# Patient Record
Sex: FEMALE | Race: WHITE | NOT HISPANIC OR LATINO | Employment: OTHER | URBAN - METROPOLITAN AREA
[De-identification: names, ages, dates, MRNs, and addresses within clinical notes are randomized per-mention and may not be internally consistent; named-entity substitution may affect disease eponyms.]

---

## 2017-01-17 ENCOUNTER — ALLSCRIPTS OFFICE VISIT (OUTPATIENT)
Dept: OTHER | Facility: OTHER | Age: 52
End: 2017-01-17

## 2017-02-21 ENCOUNTER — ALLSCRIPTS OFFICE VISIT (OUTPATIENT)
Dept: OTHER | Facility: OTHER | Age: 52
End: 2017-02-21

## 2017-02-24 DIAGNOSIS — E78.5 HYPERLIPIDEMIA: ICD-10-CM

## 2017-02-24 DIAGNOSIS — G40.909 EPILEPSY WITHOUT STATUS EPILEPTICUS, NOT INTRACTABLE (HCC): ICD-10-CM

## 2017-11-29 ENCOUNTER — ALLSCRIPTS OFFICE VISIT (OUTPATIENT)
Dept: OTHER | Facility: OTHER | Age: 52
End: 2017-11-29

## 2017-11-30 NOTE — PROGRESS NOTES
History of Present Illness  Ear Fullness: Current treatment includes non-prescription cerumen softeners  Active Problems  1  Ambulatory dysfunction (719 7) (R26 2)  2  Borderline hyperlipidemia (272 4) (E78 5)  3  Osteoarthritis of knee (715 36) (M17 10)  4  Seizure disorder (345 90) (G40 909)  5  Tinea pedis (110 4) (B35 3)    Past Medical History  1  History of Cystocele  2  History of cerebral palsy (V12 49) (Z86 69)    Family History  Mother   1  Family history of osteoarthritis (V17 89) (Z82 69)  2  Family history of thyroid disease (V18 19) (Z83 49)  Father   3  Family history of Alzheimer's disease (V17 2) (Z82 0)  Maternal Grandmother   4  Family history of Coronary heart disease  5  Family history of malignant neoplasm of cervix (V16 49) (Z80 49)  Maternal Grandfather   6  Family history of Coronary heart disease    Social History   · Currently works part-time (V49 89) (Z78 9)   · Lives with parents   · Never smoked    Current Meds  1  Aspirin EC 81 MG Oral Tablet Delayed Release; Therapy: (Recorded:07Pjw7013) to Recorded  2  CarBAMazepine  MG Oral Tablet Extended Release 12 Hour; Therapy: (Recorded:17Jan2017) to Recorded    Allergies  1  No Known Drug Allergies    Vitals   Recorded: 65MAD2920 03:03PM   Height 5 ft 1 in   Weight 175 lb    BMI Calculated 33 07   BSA Calculated 1 78     Procedure           Procedure: cerumen removal   Indication: in both ears  Prep: Cerumenex was placed in the canal prior to the procedure  Procedure Note: The procedure was performed by RN  A otoscope was placed in the ear canal(s) to visualize the ear canal debris  The ear was cleaned by using suction-- and-- warm water irrigation  The procedure was successful  Post-Procedure:  Patient Status: the patient tolerated the procedure well  Complications: there were no complications  Follow-up as needed              Signatures   Electronically signed by : Dara Mckenzie, ; Dec 13 2017  1:53PM EST (Author)    Electronically signed by : Ezra Lynch, [facility]; Dec 13 2017  1:57PM EST                       (Author)    Electronically signed by : DELICIA Black ; Dec 13 2017  1:58PM EST                       (Author)

## 2018-01-13 VITALS — BODY MASS INDEX: 33.04 KG/M2 | HEIGHT: 61 IN | WEIGHT: 175 LBS

## 2018-01-15 VITALS
SYSTOLIC BLOOD PRESSURE: 110 MMHG | BODY MASS INDEX: 32.39 KG/M2 | DIASTOLIC BLOOD PRESSURE: 64 MMHG | HEIGHT: 62 IN | WEIGHT: 176 LBS

## 2018-01-15 NOTE — PROGRESS NOTES
Assessment    1  Encounter for preventive health examination (V70 0) (Z00 00)    Plan  Health Maintenance    · Follow-up visit in 1 year Evaluation and Treatment  Follow-up  Status: Hold For -  Scheduling  Requested for: 77Lyc1561   · Drink plenty of fluids ; Status:Complete;   Done: 95IMX0386 10:37AM   · We encourage all of our patients to exercise regularly  30 minutes of exercise or physical  activity five or more days a week is recommended for children and adults ;  Status:Complete;   Done: 26Vkl6139 10:37AM   · We recommend that you follow the "Mediterranean diet "; Status:Complete;   Done:  16Dat1775 10:37AM    Check Fasting lipid profile, CMP, CBC, TSH  Form/documents completed and signed  PPD today, check 48-72 hrs     Discussion/Summary  Extended discussion about the family stressors donna regarding her father's AD progression, support for her and her mother, work network  Chief Complaint  pe      History of Present Illness  HPI: 47 y/o WF for yearly PE for work  The pt works PT at [employer] the [employer]  No acute c/o  She does report increased stress at home RE: her father's progressive deterioration in his AD  Review of Systems    Constitutional: No fever, no chills, feels well, no tiredness, no recent weight gain or weight loss  Eyes: wears glasses  Lateral deviation of right eye  ENT: no complaints of earache, no loss of hearing, no nose bleeds, no nasal discharge, no sore throat, no hoarseness  Cardiovascular: No complaints of slow heart rate, no fast heart rate, no chest pain, no palpitations, no leg claudication, no lower extremity edema  Respiratory: No complaints of shortness of breath, no wheezing, no cough, no SOB on exertion, no orthopnea, no PND  Gastrointestinal: No complaints of abdominal pain, no constipation, no nausea or vomiting, no diarrhea, no bloody stools     Genitourinary: No complaints of dysuria, no incontinence, no pelvic pain, no dysmenorrhea, no vaginal discharge or bleeding  Musculoskeletal: arthralgias, joint stiffness and DJD bilateral knees  Sees Ortho q3 mo  Integumentary: No complaints of skin rash or lesions, no itching, no skin wounds, no breast pain or lump  Neurological: limb weakness, difficulty walking and hemiparetic gait; LUE and LLE weakness, but as noted in HPI  Endocrine: No complaints of proptosis, no hot flashes, no muscle weakness, no deepening of the voice, no feelings of weakness  Hematologic/Lymphatic: No complaints of swollen glands, no swollen glands in the neck, does not bleed easily, does not bruise easily  Active Problems    1  Ambulatory dysfunction (719 7) (R26 2)   2  Osteoarthritis of knee (715 36) (M17 9)   3  Seizure disorder (345 90) (G40 909)   4  Tinea pedis (110 4) (B35 3)    Past Medical History    · History of Cystocele (618 01) (N81 10)   · History of cerebral palsy (V12 49) (Z86 69)    Family History  Mother    · Family history of osteoarthritis (V17 89) (Z82 69)   · Family history of thyroid disease (V18 19) (Z80 46)  Father    · Family history of Alzheimer's disease (V17 2) (Z82 0)  Maternal Grandmother    · Family history of Coronary heart disease   · Family history of malignant neoplasm of cervix (V16 49) (Z80 49)  Maternal Grandfather    · Family history of Coronary heart disease    Social History    · Currently works part-time (V49 89) (Z78 9)   · Lives with parents   · Never smoked    Current Meds   1  Aspirin EC 81 MG Oral Tablet Delayed Release; Therapy: (Recorded:17Jan2017) to Recorded   2  CarBAMazepine  MG Oral Tablet Extended Release 12 Hour; Therapy: (Recorded:17Jan2017) to Recorded    Allergies    1   No Known Drug Allergies    Vitals   Recorded: 33Nsd1150 09:37AM   Heart Rate 72   Systolic 977, RUE, Sitting   Diastolic 72, RUE, Sitting   Height 5 ft 1 75 in   Weight 176 lb    BMI Calculated 32 45   BSA Calculated 1 81     Physical Exam    Constitutional   General appearance: No acute distress, well appearing and well nourished  Eyes   Conjunctiva and lids: No swelling, erythema or discharge  Right eye deviation  Pupils and irises: Equal, round and reactive to light  Ears, Nose, Mouth, and Throat   External inspection of ears and nose: Normal     Oropharynx: Normal with no erythema, edema, exudate or lesions  Pulmonary   Respiratory effort: No increased work of breathing or signs of respiratory distress  Auscultation of lungs: Clear to auscultation  Cardiovascular   Auscultation of heart: Normal rate and rhythm, normal S1 and S2, without murmurs  Examination of extremities for edema and/or varicosities: Normal     Abdomen   Abdomen: Non-tender, no masses  Liver and spleen: No hepatomegaly or splenomegaly  Lymphatic   Palpation of lymph nodes in neck: No lymphadenopathy  Musculoskeletal   Gait and station: Abnormal   Gait evaluation demonstrated shuffling, hemiparetic on the right and limping on the right, but no antalgia  Digits and nails: Normal without clubbing or cyanosis  Inspection/palpation of joints, bones, and muscles: Normal     Skin   Skin and subcutaneous tissue: Normal without rashes or lesions  Neurologic   Cranial nerves: Abnormal   Cranial Nerve II: visual acuity and visual fields were intact  Cranial Nerves III, IV, and VI: Abnormal   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  Cranial Nerve V: sensation to the face and masseter strength were intact  Cranial Nerve VII: facial strength was intact bilaterally  Cranial Nerve VIII: hearing was intact  Cranial Nerves IX and X: there was normal movement of the soft palate and normal gag  Cranial Nerve XI: shoulder shrug was intact bilaterally  Cranial Nerve XII: there was no tongue deviation with protrusion  Reflexes: 2+ and symmetric  Sensation: No sensory loss      Psychiatric   Orientation to person, place, and time: Normal     Mood and affect: Normal        Signatures   Electronically signed by : DELICIA Higuera ; Feb 21 2017 10:40AM EST                       (Author)

## 2018-01-22 VITALS
WEIGHT: 176 LBS | HEART RATE: 72 BPM | SYSTOLIC BLOOD PRESSURE: 118 MMHG | DIASTOLIC BLOOD PRESSURE: 72 MMHG | BODY MASS INDEX: 32.39 KG/M2 | HEIGHT: 62 IN

## 2018-02-01 DIAGNOSIS — G80.9 CEREBRAL PALSY, UNSPECIFIED TYPE (HCC): Primary | ICD-10-CM

## 2018-02-01 RX ORDER — CARBAMAZEPINE 200 MG/1
1 TABLET, EXTENDED RELEASE ORAL 2 TIMES DAILY
COMMUNITY
End: 2018-02-01 | Stop reason: SDUPTHER

## 2018-02-01 RX ORDER — CARBAMAZEPINE 200 MG/1
200 TABLET, EXTENDED RELEASE ORAL 2 TIMES DAILY
Qty: 30 TABLET | Refills: 5 | Status: SHIPPED | OUTPATIENT
Start: 2018-02-01 | End: 2018-08-16 | Stop reason: SDUPTHER

## 2018-02-20 ENCOUNTER — TELEPHONE (OUTPATIENT)
Dept: FAMILY MEDICINE CLINIC | Facility: CLINIC | Age: 53
End: 2018-02-20

## 2018-03-09 DIAGNOSIS — M15.9 GENERALIZED OSTEOARTHROSIS, INVOLVING MULTIPLE SITES: Primary | ICD-10-CM

## 2018-03-09 RX ORDER — IBUPROFEN 600 MG/1
600 TABLET ORAL EVERY 8 HOURS PRN
Qty: 90 TABLET | Refills: 1 | Status: SHIPPED | OUTPATIENT
Start: 2018-03-09 | End: 2018-05-31 | Stop reason: SDUPTHER

## 2018-04-09 ENCOUNTER — OFFICE VISIT (OUTPATIENT)
Dept: FAMILY MEDICINE CLINIC | Facility: CLINIC | Age: 53
End: 2018-04-09
Payer: MEDICARE

## 2018-04-09 VITALS
HEART RATE: 74 BPM | WEIGHT: 161 LBS | OXYGEN SATURATION: 98 % | BODY MASS INDEX: 31.61 KG/M2 | DIASTOLIC BLOOD PRESSURE: 52 MMHG | SYSTOLIC BLOOD PRESSURE: 90 MMHG | TEMPERATURE: 99.3 F | HEIGHT: 60 IN

## 2018-04-09 DIAGNOSIS — E78.2 MIXED HYPERLIPIDEMIA: ICD-10-CM

## 2018-04-09 DIAGNOSIS — G40.909 SEIZURE DISORDER (HCC): ICD-10-CM

## 2018-04-09 DIAGNOSIS — F79 MENTAL RETARDATION: ICD-10-CM

## 2018-04-09 DIAGNOSIS — Z00.00 ENCOUNTER FOR ROUTINE ADULT HEALTH EXAMINATION WITHOUT ABNORMAL FINDINGS: Primary | ICD-10-CM

## 2018-04-09 PROBLEM — M17.10 OSTEOARTHRITIS OF KNEE: Status: ACTIVE | Noted: 2017-01-17

## 2018-04-09 PROBLEM — R26.2 AMBULATORY DYSFUNCTION: Status: ACTIVE | Noted: 2017-01-17

## 2018-04-09 PROBLEM — B35.3 TINEA PEDIS: Status: ACTIVE | Noted: 2017-01-17

## 2018-04-09 PROBLEM — E78.5 BORDERLINE HYPERLIPIDEMIA: Status: ACTIVE | Noted: 2017-02-24

## 2018-04-09 PROCEDURE — 99396 PREV VISIT EST AGE 40-64: CPT | Performed by: PHYSICIAN ASSISTANT

## 2018-04-09 RX ORDER — SIMVASTATIN 40 MG
40 TABLET ORAL
Qty: 30 TABLET | Refills: 3 | Status: SHIPPED | OUTPATIENT
Start: 2018-04-09 | End: 2018-08-23 | Stop reason: SDUPTHER

## 2018-04-09 NOTE — PROGRESS NOTES
Assessment/Plan:    Encounter for routine adult health examination without abnormal findings  Pt does not partake in most screening such as pap, mammo, colonoscopy etc  Sees dentist regularly  CMP, TSH, lipids at next visit    Mixed hyperlipidemia  Pt and mother agreeable to starting statin therapy for HLD  Will start simvastatin 40 mg  Lipid panel and CMP in 3 months    Seizure disorder ([facility] [address] )  Well controlled on carbamazepine    Mental retardation  Working at [employer] and lives with mother who is her caretaker       Diagnoses and all orders for this visit:    Encounter for routine adult health examination without abnormal findings  -     simvastatin (ZOCOR) 40 mg tablet; Take 1 tablet (40 mg total) by mouth daily at bedtime    Mixed hyperlipidemia    Mental retardation    Seizure disorder ([address] )    Other orders  -     aspirin 81 MG tablet; Take 1 tablet by mouth daily          Subjective:      Patient ID: Allison Carmona is a 45 y o  female  44 y/o female hx hyperlipidemia, MR, And unspecified seizure disorder presents for annual exam    Lipid panel performed last year revealed total cholesterol 279, , HDL 70 and triglycerides 173  Mother reports that they are now made aware of this  Patient has a poor diet and does not exercise regularly   seizure disorder has been well controlled with carbamazepine 300 mg twice per day  Carbamazepine levels drawn last year were within normal limits  She is unable to describe her specific seizure disorder  Lives at home with her mother who is her caretaker    Working at the [employer]   The patient does not partake in Pap, mammograms or colonoscopy due to mental retardation  At the discretion of her mother        The following portions of the patient's history were reviewed and updated as appropriate: allergies, current medications, past family history, past medical history, past social history, past surgical history and problem list     Review of Systems   Constitutional: Negative for diaphoresis, fatigue and fever  HENT: Negative for congestion, ear pain, hearing loss, postnasal drip, rhinorrhea and sore throat  Eyes: Negative for pain, redness and visual disturbance  Respiratory: Negative for cough, shortness of breath and wheezing  Cardiovascular: Negative for chest pain, palpitations and leg swelling  Gastrointestinal: Negative for anal bleeding, constipation, diarrhea, nausea and vomiting  Endocrine: Negative for polydipsia and polyuria  Genitourinary: Negative for dysuria, flank pain and hematuria  Musculoskeletal: Positive for arthralgias  Negative for back pain, joint swelling and myalgias  Skin: Negative for pallor, rash and wound  Neurological: Negative for dizziness, syncope, numbness and headaches  Hematological: Negative for adenopathy  Does not bruise/bleed easily  Psychiatric/Behavioral: Negative for dysphoric mood and sleep disturbance  The patient is not nervous/anxious  Objective:      BP 90/52 (BP Location: Left arm, Patient Position: Sitting, Cuff Size: Large)   Pulse 74   Temp 99 3 °F (37 4 °C)   Ht 5' (1 524 m)   Wt 73 kg (161 lb)   SpO2 98%   BMI 31 44 kg/m²          Physical Exam   Constitutional: She is oriented to person, place, and time  She appears well-developed and well-nourished  No distress  HENT:   Head: Normocephalic and atraumatic  Mouth/Throat: Oropharynx is clear and moist    Eyes: Conjunctivae and EOM are normal  Pupils are equal, round, and reactive to light  Right eye exhibits no discharge  Left eye exhibits no discharge  No scleral icterus  Strabismus     Neck: Normal range of motion  Neck supple  No thyromegaly present  Cardiovascular: Normal rate, regular rhythm and normal heart sounds  Exam reveals no gallop and no friction rub  No murmur heard  Pulmonary/Chest: Effort normal and breath sounds normal  No respiratory distress  She has no wheezes  She has no rales  Abdominal: Soft   She exhibits no distension and no mass  There is no tenderness  There is no rebound and no guarding  Musculoskeletal: Normal range of motion  She exhibits no edema  Lymphadenopathy:     She has no cervical adenopathy  Neurological: She is alert and oriented to person, place, and time  No cranial nerve deficit  Skin: Skin is warm and dry  No rash noted  She is not diaphoretic  No erythema  No pallor  Psychiatric: Cognition and memory are impaired

## 2018-04-09 NOTE — ASSESSMENT & PLAN NOTE
Pt and mother agreeable to starting statin therapy for HLD  Will start simvastatin 40 mg   Lipid panel and CMP in 3 months

## 2018-04-09 NOTE — ASSESSMENT & PLAN NOTE
Pt does not partake in most screening such as pap, mammo, colonoscopy etc  Sees dentist regularly   CMP, TSH, lipids at next visit

## 2018-05-31 DIAGNOSIS — M15.9 GENERALIZED OSTEOARTHROSIS, INVOLVING MULTIPLE SITES: ICD-10-CM

## 2018-06-01 RX ORDER — IBUPROFEN 600 MG/1
600 TABLET ORAL EVERY 8 HOURS PRN
Qty: 90 TABLET | Refills: 1 | Status: SHIPPED | OUTPATIENT
Start: 2018-06-01 | End: 2018-08-03 | Stop reason: SDUPTHER

## 2018-08-03 DIAGNOSIS — M15.9 GENERALIZED OSTEOARTHROSIS, INVOLVING MULTIPLE SITES: ICD-10-CM

## 2018-08-03 RX ORDER — IBUPROFEN 600 MG/1
600 TABLET ORAL EVERY 8 HOURS PRN
Qty: 90 TABLET | Refills: 1 | Status: SHIPPED | OUTPATIENT
Start: 2018-08-03 | End: 2018-10-04 | Stop reason: SDUPTHER

## 2018-08-16 DIAGNOSIS — G80.9 CEREBRAL PALSY, UNSPECIFIED TYPE (HCC): ICD-10-CM

## 2018-08-16 RX ORDER — CARBAMAZEPINE 200 MG/1
200 TABLET, EXTENDED RELEASE ORAL 2 TIMES DAILY
Qty: 180 TABLET | Refills: 1 | Status: SHIPPED | OUTPATIENT
Start: 2018-08-16 | End: 2019-02-19 | Stop reason: SDUPTHER

## 2018-08-23 DIAGNOSIS — Z00.00 ENCOUNTER FOR ROUTINE ADULT HEALTH EXAMINATION WITHOUT ABNORMAL FINDINGS: ICD-10-CM

## 2018-08-23 RX ORDER — SIMVASTATIN 40 MG
40 TABLET ORAL
Qty: 30 TABLET | Refills: 11 | Status: SHIPPED | OUTPATIENT
Start: 2018-08-23 | End: 2019-09-26 | Stop reason: SDUPTHER

## 2018-10-04 DIAGNOSIS — M15.9 GENERALIZED OSTEOARTHROSIS, INVOLVING MULTIPLE SITES: ICD-10-CM

## 2018-10-04 RX ORDER — IBUPROFEN 600 MG/1
600 TABLET ORAL EVERY 8 HOURS PRN
Qty: 90 TABLET | Refills: 1 | Status: SHIPPED | OUTPATIENT
Start: 2018-10-04 | End: 2018-12-24 | Stop reason: SDUPTHER

## 2018-12-24 DIAGNOSIS — M15.9 GENERALIZED OSTEOARTHROSIS, INVOLVING MULTIPLE SITES: ICD-10-CM

## 2018-12-24 DIAGNOSIS — E78.2 MIXED HYPERLIPIDEMIA: Primary | ICD-10-CM

## 2018-12-24 RX ORDER — IBUPROFEN 600 MG/1
600 TABLET ORAL EVERY 8 HOURS PRN
Qty: 90 TABLET | Refills: 1 | Status: SHIPPED | OUTPATIENT
Start: 2018-12-24 | End: 2019-09-26 | Stop reason: SDUPTHER

## 2018-12-24 NOTE — TELEPHONE ENCOUNTER
Pt overdue for appt- please have her schedule f/u appt and come in for fasting lipids and cmp several days prior as we had started her on a statin at the last visit

## 2019-01-08 ENCOUNTER — TELEPHONE (OUTPATIENT)
Dept: FAMILY MEDICINE CLINIC | Facility: CLINIC | Age: 54
End: 2019-01-08

## 2019-01-08 ENCOUNTER — DOCUMENTATION (OUTPATIENT)
Dept: FAMILY MEDICINE CLINIC | Facility: CLINIC | Age: 54
End: 2019-01-08

## 2019-02-16 DIAGNOSIS — G80.9 CEREBRAL PALSY, UNSPECIFIED TYPE (HCC): ICD-10-CM

## 2019-02-17 RX ORDER — CARBAMAZEPINE 200 MG/1
TABLET, EXTENDED RELEASE ORAL
Qty: 180 TABLET | Refills: 1 | OUTPATIENT
Start: 2019-02-17

## 2019-02-19 ENCOUNTER — OFFICE VISIT (OUTPATIENT)
Dept: FAMILY MEDICINE CLINIC | Facility: CLINIC | Age: 54
End: 2019-02-19
Payer: MEDICARE

## 2019-02-19 VITALS
TEMPERATURE: 97.9 F | BODY MASS INDEX: 28.34 KG/M2 | WEIGHT: 154 LBS | HEART RATE: 61 BPM | DIASTOLIC BLOOD PRESSURE: 70 MMHG | SYSTOLIC BLOOD PRESSURE: 120 MMHG | HEIGHT: 62 IN | RESPIRATION RATE: 18 BRPM | OXYGEN SATURATION: 98 %

## 2019-02-19 DIAGNOSIS — E78.2 MIXED HYPERLIPIDEMIA: ICD-10-CM

## 2019-02-19 DIAGNOSIS — Z11.59 NEED FOR HEPATITIS C SCREENING TEST: ICD-10-CM

## 2019-02-19 DIAGNOSIS — Z00.00 MEDICARE ANNUAL WELLNESS VISIT, SUBSEQUENT: Primary | ICD-10-CM

## 2019-02-19 DIAGNOSIS — G40.909 SEIZURE DISORDER (HCC): ICD-10-CM

## 2019-02-19 DIAGNOSIS — G80.9 CEREBRAL PALSY, UNSPECIFIED TYPE (HCC): ICD-10-CM

## 2019-02-19 DIAGNOSIS — Z79.899 ON CARBAMAZEPINE THERAPY: ICD-10-CM

## 2019-02-19 LAB
ALBUMIN SERPL BCP-MCNC: 3.9 G/DL (ref 3.5–5)
ALP SERPL-CCNC: 118 U/L (ref 46–116)
ALT SERPL W P-5'-P-CCNC: 27 U/L (ref 12–78)
ANION GAP SERPL CALCULATED.3IONS-SCNC: 7 MMOL/L (ref 4–13)
AST SERPL W P-5'-P-CCNC: 16 U/L (ref 5–45)
BASOPHILS # BLD AUTO: 0.05 THOUSANDS/ΜL (ref 0–0.1)
BASOPHILS NFR BLD AUTO: 1 % (ref 0–1)
BILIRUB SERPL-MCNC: 0.32 MG/DL (ref 0.2–1)
BUN SERPL-MCNC: 14 MG/DL (ref 5–25)
CALCIUM SERPL-MCNC: 9 MG/DL (ref 8.3–10.1)
CARBAMAZEPINE SERPL-MCNC: 9.4 UG/ML (ref 4–12)
CHLORIDE SERPL-SCNC: 108 MMOL/L (ref 100–108)
CHOLEST SERPL-MCNC: 196 MG/DL (ref 50–200)
CO2 SERPL-SCNC: 29 MMOL/L (ref 21–32)
CREAT SERPL-MCNC: 0.59 MG/DL (ref 0.6–1.3)
EOSINOPHIL # BLD AUTO: 0.26 THOUSAND/ΜL (ref 0–0.61)
EOSINOPHIL NFR BLD AUTO: 4 % (ref 0–6)
ERYTHROCYTE [DISTWIDTH] IN BLOOD BY AUTOMATED COUNT: 12 % (ref 11.6–15.1)
GFR SERPL CREATININE-BSD FRML MDRD: 104 ML/MIN/1.73SQ M
GLUCOSE P FAST SERPL-MCNC: 88 MG/DL (ref 65–99)
HCT VFR BLD AUTO: 42.3 % (ref 34.8–46.1)
HDLC SERPL-MCNC: 66 MG/DL (ref 40–60)
HGB BLD-MCNC: 13.7 G/DL (ref 11.5–15.4)
IMM GRANULOCYTES # BLD AUTO: 0.02 THOUSAND/UL (ref 0–0.2)
IMM GRANULOCYTES NFR BLD AUTO: 0 % (ref 0–2)
LDLC SERPL CALC-MCNC: 97 MG/DL (ref 0–100)
LYMPHOCYTES # BLD AUTO: 1.73 THOUSANDS/ΜL (ref 0.6–4.47)
LYMPHOCYTES NFR BLD AUTO: 25 % (ref 14–44)
MCH RBC QN AUTO: 31.6 PG (ref 26.8–34.3)
MCHC RBC AUTO-ENTMCNC: 32.4 G/DL (ref 31.4–37.4)
MCV RBC AUTO: 98 FL (ref 82–98)
MONOCYTES # BLD AUTO: 0.48 THOUSAND/ΜL (ref 0.17–1.22)
MONOCYTES NFR BLD AUTO: 7 % (ref 4–12)
NEUTROPHILS # BLD AUTO: 4.5 THOUSANDS/ΜL (ref 1.85–7.62)
NEUTS SEG NFR BLD AUTO: 63 % (ref 43–75)
NRBC BLD AUTO-RTO: 0 /100 WBCS
PLATELET # BLD AUTO: 257 THOUSANDS/UL (ref 149–390)
PMV BLD AUTO: 9.9 FL (ref 8.9–12.7)
POTASSIUM SERPL-SCNC: 4.8 MMOL/L (ref 3.5–5.3)
PROT SERPL-MCNC: 7.4 G/DL (ref 6.4–8.2)
RBC # BLD AUTO: 4.34 MILLION/UL (ref 3.81–5.12)
SODIUM SERPL-SCNC: 144 MMOL/L (ref 136–145)
TRIGL SERPL-MCNC: 165 MG/DL
TSH SERPL DL<=0.05 MIU/L-ACNC: 2.38 UIU/ML (ref 0.36–3.74)
WBC # BLD AUTO: 7.04 THOUSAND/UL (ref 4.31–10.16)

## 2019-02-19 PROCEDURE — 36415 COLL VENOUS BLD VENIPUNCTURE: CPT | Performed by: PHYSICIAN ASSISTANT

## 2019-02-19 PROCEDURE — 80061 LIPID PANEL: CPT | Performed by: PHYSICIAN ASSISTANT

## 2019-02-19 PROCEDURE — 85025 COMPLETE CBC W/AUTO DIFF WBC: CPT | Performed by: PHYSICIAN ASSISTANT

## 2019-02-19 PROCEDURE — G0439 PPPS, SUBSEQ VISIT: HCPCS | Performed by: PHYSICIAN ASSISTANT

## 2019-02-19 PROCEDURE — 80156 ASSAY CARBAMAZEPINE TOTAL: CPT | Performed by: PHYSICIAN ASSISTANT

## 2019-02-19 PROCEDURE — 80053 COMPREHEN METABOLIC PANEL: CPT | Performed by: PHYSICIAN ASSISTANT

## 2019-02-19 PROCEDURE — 99213 OFFICE O/P EST LOW 20 MIN: CPT | Performed by: PHYSICIAN ASSISTANT

## 2019-02-19 PROCEDURE — 84443 ASSAY THYROID STIM HORMONE: CPT | Performed by: PHYSICIAN ASSISTANT

## 2019-02-19 PROCEDURE — 86803 HEPATITIS C AB TEST: CPT | Performed by: PHYSICIAN ASSISTANT

## 2019-02-19 RX ORDER — CARBAMAZEPINE 200 MG/1
200 TABLET, EXTENDED RELEASE ORAL 2 TIMES DAILY
Qty: 180 TABLET | Refills: 1 | Status: SHIPPED | OUTPATIENT
Start: 2019-02-19 | End: 2019-08-19 | Stop reason: SDUPTHER

## 2019-02-19 NOTE — PROGRESS NOTES
Assessment/Plan:    Medicare annual wellness visit, subsequent  Fasting labs including hep c screening drawn today  The patient and her mother both refuse screening procedures such as Pap smear, colonoscopy, mammogram due to patient's MR  Five wishes Packet given today advised to complete for advance directive planning  Seizure disorder ([facility] [address] )  Well controlled on carbamazepine  Will check carbamazepine levels today    Mixed hyperlipidemia  Patient tolerating simvastatin 40 mg well  Will check lipid panel and CMP today to monitor response       Diagnoses and all orders for this visit:    Medicare annual wellness visit, subsequent    Mixed hyperlipidemia  -     CBC and differential  -     Comprehensive metabolic panel  -     Lipid Panel with Direct LDL reflex  -     TSH, 3rd generation with Free T4 reflex    Seizure disorder (HCC)  -     TSH, 3rd generation with Free T4 reflex    Cerebral palsy, unspecified type (HCC)  -     carBAMazepine (TEGretol XR) 200 mg 12 hr tablet; Take 1 tablet (200 mg total) by mouth 2 (two) times a day    On carbamazepine therapy  -     Carbamazepine level, total    Mixed hyperlipidemia   -     CBC and differential  -     Comprehensive metabolic panel  -     Lipid Panel with Direct LDL reflex  -     TSH, 3rd generation with Free T4 reflex    Need for hepatitis C screening test  -     Hepatitis C antibody          Subjective:      Patient ID: Homero Yadav is a 52 y o  female  61-year-old female presents for follow-up of hyperlipidemia as well as seizure disorder  She has a history of mental retardation as well as cerebral palsy  At last visit simvastatin 40 mg was started due to her very high cholesterol, LDL was over 170  She reports she has tolerated this addition well without muscle aches, abdominal pain or any other symptoms  Seizure disorder has been well controlled on carbamazepine    She is due for a level check  She continues to work 3 times per week      The following portions of the patient's history were reviewed and updated as appropriate: allergies, current medications, past family history, past medical history, past social history, past surgical history and problem list     Review of Systems   Constitutional: Negative for diaphoresis, fatigue and fever  HENT: Negative for congestion, ear pain, hearing loss, postnasal drip, rhinorrhea and sore throat  Eyes: Negative for pain, redness and visual disturbance  Respiratory: Negative for cough, shortness of breath and wheezing  Cardiovascular: Negative for chest pain, palpitations and leg swelling  Gastrointestinal: Negative for anal bleeding, constipation, diarrhea, nausea and vomiting  Endocrine: Negative for polydipsia and polyuria  Genitourinary: Negative for dysuria, flank pain and hematuria  Musculoskeletal: Negative for arthralgias, back pain, joint swelling and myalgias  Skin: Negative for pallor, rash and wound  Neurological: Negative for dizziness, syncope, numbness and headaches  Hematological: Negative for adenopathy  Does not bruise/bleed easily  Psychiatric/Behavioral: Negative for dysphoric mood and sleep disturbance  The patient is not nervous/anxious  Objective:      /70 (BP Location: Left arm, Patient Position: Sitting, Cuff Size: Large)   Pulse 61   Temp 97 9 °F (36 6 °C) (Tympanic)   Resp 18   Ht 5' 2" (1 575 m)   Wt 69 9 kg (154 lb)   SpO2 98%   BMI 28 17 kg/m²          Physical Exam   Constitutional: She is oriented to person, place, and time  She appears well-developed and well-nourished  No distress  HENT:   Head: Normocephalic and atraumatic  Mouth/Throat: Oropharynx is clear and moist    Eyes: Pupils are equal, round, and reactive to light  Conjunctivae and EOM are normal  Right eye exhibits no discharge  Left eye exhibits no discharge  No scleral icterus  Neck: Normal range of motion  Neck supple  No thyromegaly present     Cardiovascular: Normal rate, regular rhythm and normal heart sounds  Exam reveals no gallop and no friction rub  No murmur heard  Pulmonary/Chest: Effort normal and breath sounds normal  No respiratory distress  She has no wheezes  She has no rales  Abdominal: Soft  She exhibits no distension and no mass  There is no tenderness  There is no rebound and no guarding  Musculoskeletal: Normal range of motion  She exhibits no edema  Lymphadenopathy:     She has no cervical adenopathy  Neurological: She is alert and oriented to person, place, and time  No cranial nerve deficit  Skin: Skin is warm and dry  No rash noted  She is not diaphoretic  No erythema  No pallor

## 2019-02-19 NOTE — ASSESSMENT & PLAN NOTE
Patient tolerating simvastatin 40 mg well    Will check lipid panel and CMP today to monitor response

## 2019-02-19 NOTE — ASSESSMENT & PLAN NOTE
Fasting labs including hep c screening drawn today  The patient and her mother both refuse screening procedures such as Pap smear, colonoscopy, mammogram due to patient's MR  Five wishes Packet given today advised to complete for advance directive planning

## 2019-02-20 ENCOUNTER — TELEPHONE (OUTPATIENT)
Dept: FAMILY MEDICINE CLINIC | Facility: CLINIC | Age: 54
End: 2019-02-20

## 2019-02-20 LAB — HCV AB SER QL: NORMAL

## 2019-02-20 NOTE — TELEPHONE ENCOUNTER
----- Message from Willean Dancer, PA-C sent at 2/19/2019  8:00 PM EST -----  Please tell patient that cholesterol panel has significantly improved and is now at goal  Continue simvastatin 40 mg daily

## 2019-04-18 DIAGNOSIS — G80.9 CEREBRAL PALSY, UNSPECIFIED TYPE (HCC): ICD-10-CM

## 2019-04-19 RX ORDER — CARBAMAZEPINE 200 MG/1
200 TABLET, EXTENDED RELEASE ORAL 2 TIMES DAILY
Qty: 180 TABLET | Refills: 1 | OUTPATIENT
Start: 2019-04-19

## 2019-05-08 DIAGNOSIS — M15.9 GENERALIZED OSTEOARTHROSIS, INVOLVING MULTIPLE SITES: ICD-10-CM

## 2019-05-11 RX ORDER — IBUPROFEN 600 MG/1
600 TABLET ORAL EVERY 8 HOURS PRN
Qty: 90 TABLET | Refills: 1 | OUTPATIENT
Start: 2019-05-11

## 2019-05-18 DIAGNOSIS — M15.9 GENERALIZED OSTEOARTHROSIS, INVOLVING MULTIPLE SITES: ICD-10-CM

## 2019-05-18 RX ORDER — IBUPROFEN 600 MG/1
600 TABLET ORAL EVERY 8 HOURS PRN
Qty: 90 TABLET | Refills: 1 | OUTPATIENT
Start: 2019-05-18

## 2019-08-15 ENCOUNTER — TELEPHONE (OUTPATIENT)
Dept: FAMILY MEDICINE CLINIC | Facility: CLINIC | Age: 54
End: 2019-08-15

## 2019-08-15 NOTE — TELEPHONE ENCOUNTER
Juan Alberto Cruz, Patients family member called inquiring about the patients appointment on Monday stating they may need to change it  Unfortunately she is not on the communication consent and someone who is would need to call back for this information

## 2019-08-19 DIAGNOSIS — G80.9 CEREBRAL PALSY, UNSPECIFIED TYPE (HCC): ICD-10-CM

## 2019-08-19 DIAGNOSIS — G40.909 SEIZURE DISORDER (HCC): Primary | ICD-10-CM

## 2019-08-19 RX ORDER — CARBAMAZEPINE 200 MG/1
200 TABLET, EXTENDED RELEASE ORAL 2 TIMES DAILY
Qty: 180 TABLET | Refills: 0 | Status: SHIPPED | OUTPATIENT
Start: 2019-08-19 | End: 2019-11-18 | Stop reason: SDUPTHER

## 2019-08-22 ENCOUNTER — TELEPHONE (OUTPATIENT)
Dept: FAMILY MEDICINE CLINIC | Facility: CLINIC | Age: 54
End: 2019-08-22

## 2019-08-22 NOTE — TELEPHONE ENCOUNTER
Patients niece called stating she needs physical form completed  Patient had a physical with Rosangela Vanegas in February  She has not seen Dr Aliya Fox  Patient needs [facility] with Dr Aliya Fox to complete physical form  Form was completed per patients niece in February by Paola Trevizo and assisted living states they never received it  Patients niece will try to set up transportation and call back to make appt

## 2019-09-06 ENCOUNTER — APPOINTMENT (OUTPATIENT)
Dept: LAB | Facility: CLINIC | Age: 54
End: 2019-09-06
Payer: MEDICARE

## 2019-09-06 DIAGNOSIS — G40.909 SEIZURE DISORDER (HCC): ICD-10-CM

## 2019-09-06 LAB
ALBUMIN SERPL BCP-MCNC: 3.4 G/DL (ref 3.5–5)
ALP SERPL-CCNC: 123 U/L (ref 46–116)
ALT SERPL W P-5'-P-CCNC: 24 U/L (ref 12–78)
ANION GAP SERPL CALCULATED.3IONS-SCNC: 4 MMOL/L (ref 4–13)
AST SERPL W P-5'-P-CCNC: 15 U/L (ref 5–45)
BASOPHILS # BLD AUTO: 0.05 THOUSANDS/ΜL (ref 0–0.1)
BASOPHILS NFR BLD AUTO: 1 % (ref 0–1)
BILIRUB SERPL-MCNC: 0.24 MG/DL (ref 0.2–1)
BUN SERPL-MCNC: 13 MG/DL (ref 5–25)
CALCIUM SERPL-MCNC: 8.7 MG/DL (ref 8.3–10.1)
CARBAMAZEPINE SERPL-MCNC: 8.2 UG/ML (ref 4–12)
CHLORIDE SERPL-SCNC: 110 MMOL/L (ref 100–108)
CO2 SERPL-SCNC: 29 MMOL/L (ref 21–32)
CREAT SERPL-MCNC: 0.68 MG/DL (ref 0.6–1.3)
EOSINOPHIL # BLD AUTO: 0.3 THOUSAND/ΜL (ref 0–0.61)
EOSINOPHIL NFR BLD AUTO: 4 % (ref 0–6)
ERYTHROCYTE [DISTWIDTH] IN BLOOD BY AUTOMATED COUNT: 12 % (ref 11.6–15.1)
GFR SERPL CREATININE-BSD FRML MDRD: 99 ML/MIN/1.73SQ M
GLUCOSE P FAST SERPL-MCNC: 95 MG/DL (ref 65–99)
HCT VFR BLD AUTO: 39.4 % (ref 34.8–46.1)
HGB BLD-MCNC: 12.7 G/DL (ref 11.5–15.4)
IMM GRANULOCYTES # BLD AUTO: 0.02 THOUSAND/UL (ref 0–0.2)
IMM GRANULOCYTES NFR BLD AUTO: 0 % (ref 0–2)
LYMPHOCYTES # BLD AUTO: 1.91 THOUSANDS/ΜL (ref 0.6–4.47)
LYMPHOCYTES NFR BLD AUTO: 25 % (ref 14–44)
MCH RBC QN AUTO: 30.8 PG (ref 26.8–34.3)
MCHC RBC AUTO-ENTMCNC: 32.2 G/DL (ref 31.4–37.4)
MCV RBC AUTO: 96 FL (ref 82–98)
MONOCYTES # BLD AUTO: 0.6 THOUSAND/ΜL (ref 0.17–1.22)
MONOCYTES NFR BLD AUTO: 8 % (ref 4–12)
NEUTROPHILS # BLD AUTO: 4.72 THOUSANDS/ΜL (ref 1.85–7.62)
NEUTS SEG NFR BLD AUTO: 62 % (ref 43–75)
NRBC BLD AUTO-RTO: 0 /100 WBCS
PLATELET # BLD AUTO: 245 THOUSANDS/UL (ref 149–390)
PMV BLD AUTO: 9.3 FL (ref 8.9–12.7)
POTASSIUM SERPL-SCNC: 4.3 MMOL/L (ref 3.5–5.3)
PROT SERPL-MCNC: 6.9 G/DL (ref 6.4–8.2)
RBC # BLD AUTO: 4.12 MILLION/UL (ref 3.81–5.12)
SODIUM SERPL-SCNC: 143 MMOL/L (ref 136–145)
WBC # BLD AUTO: 7.6 THOUSAND/UL (ref 4.31–10.16)

## 2019-09-06 PROCEDURE — 85025 COMPLETE CBC W/AUTO DIFF WBC: CPT

## 2019-09-06 PROCEDURE — 80053 COMPREHEN METABOLIC PANEL: CPT

## 2019-09-06 PROCEDURE — 36415 COLL VENOUS BLD VENIPUNCTURE: CPT

## 2019-09-06 PROCEDURE — 80156 ASSAY CARBAMAZEPINE TOTAL: CPT

## 2019-09-26 ENCOUNTER — OFFICE VISIT (OUTPATIENT)
Dept: FAMILY MEDICINE CLINIC | Facility: CLINIC | Age: 54
End: 2019-09-26
Payer: MEDICARE

## 2019-09-26 ENCOUNTER — TELEPHONE (OUTPATIENT)
Dept: FAMILY MEDICINE CLINIC | Facility: CLINIC | Age: 54
End: 2019-09-26

## 2019-09-26 VITALS
DIASTOLIC BLOOD PRESSURE: 70 MMHG | SYSTOLIC BLOOD PRESSURE: 100 MMHG | HEART RATE: 70 BPM | RESPIRATION RATE: 16 BRPM | HEIGHT: 61 IN | BODY MASS INDEX: 29.57 KG/M2 | TEMPERATURE: 98.2 F | OXYGEN SATURATION: 94 % | WEIGHT: 156.6 LBS

## 2019-09-26 DIAGNOSIS — E78.2 MIXED HYPERLIPIDEMIA: ICD-10-CM

## 2019-09-26 DIAGNOSIS — R74.8 ELEVATED ALKALINE PHOSPHATASE LEVEL: ICD-10-CM

## 2019-09-26 DIAGNOSIS — M15.9 GENERALIZED OSTEOARTHROSIS, INVOLVING MULTIPLE SITES: ICD-10-CM

## 2019-09-26 DIAGNOSIS — G40.909 SEIZURE DISORDER (HCC): ICD-10-CM

## 2019-09-26 DIAGNOSIS — Z79.899 ON CARBAMAZEPINE THERAPY: ICD-10-CM

## 2019-09-26 DIAGNOSIS — R91.8 ABNORMAL CT SCAN, LUNG: ICD-10-CM

## 2019-09-26 DIAGNOSIS — Z78.0 MENOPAUSE: ICD-10-CM

## 2019-09-26 DIAGNOSIS — R79.0 CALCIUM BLOOD DECREASED: ICD-10-CM

## 2019-09-26 DIAGNOSIS — R16.0 HEPATOMEGALY: ICD-10-CM

## 2019-09-26 DIAGNOSIS — D72.829 LEUKOCYTOSIS, UNSPECIFIED TYPE: ICD-10-CM

## 2019-09-26 DIAGNOSIS — Z00.00 WELL ADULT EXAM: Primary | ICD-10-CM

## 2019-09-26 DIAGNOSIS — G80.9 CEREBRAL PALSY, UNSPECIFIED TYPE (HCC): ICD-10-CM

## 2019-09-26 DIAGNOSIS — Z79.1 NSAID LONG-TERM USE: ICD-10-CM

## 2019-09-26 DIAGNOSIS — Z23 IMMUNIZATION DUE: ICD-10-CM

## 2019-09-26 PROCEDURE — G0438 PPPS, INITIAL VISIT: HCPCS | Performed by: FAMILY MEDICINE

## 2019-09-26 NOTE — PROGRESS NOTES
Assessment/Plan:          Diagnoses and all orders for this visit:    Well adult exam    Generalized osteoarthrosis, involving multiple sites  -     ibuprofen (MOTRIN) 600 mg tablet; Take 1 tablet (600 mg total) by mouth every 8 (eight) hours as needed for moderate pain  -     acetaminophen (TYLENOL) 500 mg tablet; Take 2 tablets (1,000 mg total) by mouth every 8 (eight) hours as needed for mild pain    Encounter for routine adult health examination without abnormal findings  -     simvastatin (ZOCOR) 40 mg tablet; Take 1 tablet (40 mg total) by mouth daily at bedtime    Seizure disorder (HCC)    Cerebral palsy, unspecified type (HCC)    Mixed hyperlipidemia  -     Lipid Panel with Direct LDL reflex; Future    Abnormal CT scan, lung  -     XR chest pa & lateral; Future    Elevated alkaline phosphatase level  -     Alkaline phosphatase, isoenzymes; Future    Hepatomegaly  -     Alpha-1-antitrypsin; Future  -     Antimitochondrial antibody; Future  -     ROSINA Screen w/ Reflex to Titer/Pattern; Future  -     Anti-Smooth Muscle/Mitochond; Future  -     Ceruloplasmin; Future  -     Hepatitis A Ab, Total W/Refl IgM; Future  -     Hepatitis B surface antigen; Future  -     Hepatitis C antibody; Future  -     Iron Saturation %; Future  -     Iron, TIBC and Ferritin Panel; Future  -     Protime-INR; Future    Calcium blood decreased    Leukocytosis, unspecified type  Comments:  Resolved    Immunization due    On carbamazepine therapy    Menopause  -     DXA bone density spine hip and pelvis; Future    Endodontic overfill    NSAID long-term use  -     Urinalysis with reflex to microscopic            Subjective:     Patient ID: Molina Le is a 52 y o  female      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  Patient with cerebral palsy   Patient stated she feel well  She has no complaints  Denied anxiety or depression  Patient does not smoke, does not drink alcohol or do drugs    Never had positive PPD        Review of Systems   Constitutional: Negative for activity change, appetite change, chills, fatigue, fever and unexpected weight change  HENT: Negative for congestion, ear discharge, ear pain, hearing loss, nosebleeds, postnasal drip, rhinorrhea, sinus pressure, sore throat, tinnitus, trouble swallowing and voice change  Eyes: Negative for photophobia, pain, discharge, itching and visual disturbance  Respiratory: Negative for cough, chest tightness, shortness of breath and wheezing  Cardiovascular: Negative for chest pain, palpitations and leg swelling  Gastrointestinal: Negative for abdominal distention, abdominal pain, anal bleeding, blood in stool, constipation, diarrhea, nausea and vomiting  Endocrine: Negative for cold intolerance, heat intolerance, polydipsia and polyuria  Genitourinary: Negative for decreased urine volume, difficulty urinating, dyspareunia, dysuria, frequency, hematuria, urgency, vaginal bleeding, vaginal discharge and vaginal pain  Musculoskeletal: Negative for back pain, gait problem, joint swelling, myalgias and neck pain  Skin: Negative for pallor and rash  Allergic/Immunologic: Negative for immunocompromised state  Neurological: Negative for dizziness, tremors, seizures, syncope, facial asymmetry, weakness, light-headedness and headaches  Chronic left extremities weakness   Hematological: Negative for adenopathy  Does not bruise/bleed easily  Psychiatric/Behavioral: Negative for agitation, behavioral problems, confusion, dysphoric mood, hallucinations and sleep disturbance  The patient is not nervous/anxious  Physical Exam   Constitutional: She is oriented to person, place, and time  She appears well-developed and well-nourished  No distress  HENT:   Head: Normocephalic and atraumatic  Left Ear: External ear normal    Nose: Nose normal    Mouth/Throat: Oropharynx is clear and moist  No oropharyngeal exudate     Walks right ear   Tympanic membrane not visualized  Whisper test normal bilaterally  5 feet await   Eyes: Conjunctivae and EOM are normal  Right eye exhibits no discharge  Left eye exhibits no discharge  No scleral icterus  Patient with lazy right eye   Neck: Neck supple  No JVD present  No thyromegaly present  Cardiovascular: Normal rate, regular rhythm and normal heart sounds  No murmur heard  Pulses:       Carotid pulses are 3+ on the right side, and 3+ on the left side  Dorsalis pedis pulses are 3+ on the right side, and 3+ on the left side  Posterior tibial pulses are 3+ on the right side, and 3+ on the left side  Pulmonary/Chest: Effort normal and breath sounds normal  No stridor  No respiratory distress  She has no wheezes  She has no rales  Abdominal: Soft  Bowel sounds are normal  She exhibits no distension and no mass  There is no tenderness  There is no rebound and no guarding  No hernia  Genitourinary:   Genitourinary Comments: Breasts   No mass, no nipple changes, no tenderness post breast   Negative axillary lymphadenopathy bilaterally   Musculoskeletal: Normal range of motion  She exhibits no edema or deformity  Lymphadenopathy:     She has no cervical adenopathy  Neurological: She is alert and oriented to person, place, and time  She displays normal reflexes  No cranial nerve deficit or sensory deficit  Positive his extremities weakness  Patient walk with the limp  Negative Romberg  Negative Babinski bilaterally   Skin: No rash noted  She is not diaphoretic  No erythema  No pallor  Psychiatric: She has a normal mood and affect  Her behavior is normal  Thought content normal    BMI Counseling: Body mass index is 29 59 kg/m²  The BMI is above normal  Nutrition recommendations include reducing portion sizes

## 2019-09-26 NOTE — TELEPHONE ENCOUNTER
Patients nephew stated you discussed ordering blood work please review, I will mail to nephew to bring patient

## 2019-09-28 RX ORDER — ACETAMINOPHEN 500 MG
1000 TABLET ORAL EVERY 8 HOURS PRN
Qty: 30 TABLET | Refills: 0 | COMMUNITY
Start: 2019-09-28

## 2019-09-28 RX ORDER — SIMVASTATIN 40 MG
40 TABLET ORAL
Qty: 30 TABLET | Refills: 11 | Status: SHIPPED | OUTPATIENT
Start: 2019-09-28 | End: 2020-08-03

## 2019-09-28 RX ORDER — IBUPROFEN 600 MG/1
600 TABLET ORAL EVERY 8 HOURS PRN
Qty: 90 TABLET | Refills: 1 | Status: SHIPPED | OUTPATIENT
Start: 2019-09-28 | End: 2019-09-28

## 2019-09-29 NOTE — PROGRESS NOTES
Assessment/Plan:          Diagnoses and all orders for this visit:    Well adult exam  Comments: To bring her glasses with her to check her vision with next office visit    Cerebral palsy, unspecified type ([address] )    Generalized osteoarthrosis, involving multiple sites  -     Discontinue: ibuprofen (MOTRIN) 600 mg tablet; Take 1 tablet (600 mg total) by mouth every 8 (eight) hours as needed for moderate pain  -     acetaminophen (TYLENOL) 500 mg tablet; Take 2 tablets (1,000 mg total) by mouth every 8 (eight) hours as needed for mild pain    Seizure disorder (HCC)  Comments:  No recurrence    Mixed hyperlipidemia  Comments: To follow with low-fat diet  Orders:  -     simvastatin (ZOCOR) 40 mg tablet; Take 1 tablet (40 mg total) by mouth daily at bedtime  -     Lipid Panel with Direct LDL reflex; Future    Abnormal CT scan, lung  -     XR chest pa & lateral; Future    Elevated alkaline phosphatase level  -     Alkaline phosphatase, isoenzymes; Future    Hepatomegaly  -     Alpha-1-antitrypsin; Future  -     Antimitochondrial antibody; Future  -     ROSINA Screen w/ Reflex to Titer/Pattern; Future  -     Anti-Smooth Muscle/Mitochond; Future  -     Ceruloplasmin; Future  -     Hepatitis A Ab, Total W/Refl IgM; Future  -     Hepatitis B surface antigen; Future  -     Hepatitis C antibody; Future  -     Iron Saturation %; Future  -     Iron, TIBC and Ferritin Panel; Future  -     Protime-INR; Future    Calcium blood decreased  Comments:  Corrected    Leukocytosis, unspecified type  Comments:  Resolved    Immunization due  Comments:  Flu shot  Not available in the office today  To return in 2 weeks  Recommend tetanus shot if was given in the last 10 years    On carbamazepine therapy    Menopause  -     DXA bone density spine hip and pelvis;  Future    NSAID long-term use  Comments:  Advised patient to stop Motrin  Orders:  -     Urinalysis with reflex to microscopic            Subjective:     Patient ID: Ute Smith is a 52 y o  female      Post ER on August 22, 2019  Patient developed left flank pain for 1 day before she was seen in the emergency room was the moderate persistent sharp  Denied problem with urination  Pain resolved  Seizure disorder  Patient denied recurrent seizure  Hyperlipidemia admit to regular fat intake  Denied chest pain  Osteoarthritis of the joints  Patient take ibuprofen as needed for pain  Denied swelling or redness of the joint    ER report on August 22, 2019 noted  Labs and CT scan of the abdomen and pelvis reviewed results with patient and her family  Lab done on September 06/2019  Discussed result with patient and her from      Review of Systems   Constitutional: Negative for activity change, appetite change, chills, fatigue, fever and unexpected weight change  HENT: Negative for congestion, ear discharge, ear pain, hearing loss, nosebleeds, rhinorrhea, sinus pressure, sore throat, tinnitus, trouble swallowing and voice change  Eyes: Negative for photophobia, pain and visual disturbance  Respiratory: Negative for cough, chest tightness, shortness of breath and wheezing  Cardiovascular: Negative for chest pain, palpitations and leg swelling  Gastrointestinal: Negative for abdominal pain, anal bleeding, blood in stool, constipation, diarrhea, nausea and vomiting  Endocrine: Negative for cold intolerance, heat intolerance, polydipsia and polyuria  Genitourinary: Negative for dysuria, frequency, hematuria and urgency  Musculoskeletal: Negative for arthralgias, back pain, gait problem, joint swelling, myalgias and neck pain  Skin: Negative for rash  Neurological: Negative for dizziness, tremors, seizures, syncope, light-headedness and headaches  Hematological: Negative for adenopathy  Does not bruise/bleed easily  Psychiatric/Behavioral: Negative for agitation, behavioral problems, confusion, dysphoric mood, hallucinations and sleep disturbance   The patient is not nervous/anxious  Objective:     Physical Exam   Constitutional: She is oriented to person, place, and time  She appears well-developed and well-nourished  No distress  HENT:   Head: Normocephalic and atraumatic  Eyes: Pupils are equal, round, and reactive to light  Conjunctivae and EOM are normal  No scleral icterus  Neck: No JVD present  No thyromegaly present  Cardiovascular: Normal rate, regular rhythm and normal heart sounds  No murmur heard  Extremities  No edema   Pulmonary/Chest: Effort normal and breath sounds normal    Abdominal: Soft  She exhibits no mass  There is no tenderness  There is no rebound and no guarding  No hernia  Lymphadenopathy:     She has no cervical adenopathy  Neurological: She is alert and oriented to person, place, and time  No cranial nerve deficit  She exhibits normal muscle tone  Skin: No rash noted  Psychiatric: She has a normal mood and affect   Her behavior is normal  Judgment normal

## 2019-10-10 ENCOUNTER — TRANSCRIBE ORDERS (OUTPATIENT)
Dept: LAB | Facility: CLINIC | Age: 54
End: 2019-10-10

## 2019-10-10 ENCOUNTER — APPOINTMENT (OUTPATIENT)
Dept: LAB | Facility: CLINIC | Age: 54
End: 2019-10-10
Payer: MEDICARE

## 2019-10-10 DIAGNOSIS — R74.8 ELEVATED ALKALINE PHOSPHATASE LEVEL: ICD-10-CM

## 2019-10-10 DIAGNOSIS — E78.2 MIXED HYPERLIPIDEMIA: ICD-10-CM

## 2019-10-10 DIAGNOSIS — R16.0 HEPATOMEGALY: ICD-10-CM

## 2019-10-10 LAB
BACTERIA UR QL AUTO: ABNORMAL /HPF
BILIRUB UR QL STRIP: NEGATIVE
CHOLEST SERPL-MCNC: 196 MG/DL (ref 50–200)
CLARITY UR: CLEAR
COLOR UR: YELLOW
FERRITIN SERPL-MCNC: 139 NG/ML (ref 8–388)
GLUCOSE UR STRIP-MCNC: NEGATIVE MG/DL
HBV SURFACE AG SER QL: NORMAL
HCV AB SER QL: NORMAL
HDLC SERPL-MCNC: 63 MG/DL (ref 40–60)
HGB UR QL STRIP.AUTO: NEGATIVE
HYALINE CASTS #/AREA URNS LPF: ABNORMAL /LPF
INR PPP: 0.99 (ref 0.84–1.19)
IRON SATN MFR SERPL: 41 %
IRON SERPL-MCNC: 100 UG/DL (ref 50–170)
KETONES UR STRIP-MCNC: NEGATIVE MG/DL
LDLC SERPL CALC-MCNC: 94 MG/DL (ref 0–100)
LEUKOCYTE ESTERASE UR QL STRIP: ABNORMAL
NITRITE UR QL STRIP: NEGATIVE
NON-SQ EPI CELLS URNS QL MICRO: ABNORMAL /HPF
PH UR STRIP.AUTO: 6 [PH]
PROT UR STRIP-MCNC: NEGATIVE MG/DL
PROTHROMBIN TIME: 12.7 SECONDS (ref 11.6–14.5)
RBC #/AREA URNS AUTO: ABNORMAL /HPF
SP GR UR STRIP.AUTO: 1.02 (ref 1–1.03)
TIBC SERPL-MCNC: 245 UG/DL (ref 250–450)
TRIGL SERPL-MCNC: 197 MG/DL
UROBILINOGEN UR QL STRIP.AUTO: 0.2 E.U./DL
WBC #/AREA URNS AUTO: ABNORMAL /HPF

## 2019-10-10 PROCEDURE — 80061 LIPID PANEL: CPT

## 2019-10-10 PROCEDURE — 83550 IRON BINDING TEST: CPT

## 2019-10-10 PROCEDURE — 86708 HEPATITIS A ANTIBODY: CPT

## 2019-10-10 PROCEDURE — 36415 COLL VENOUS BLD VENIPUNCTURE: CPT

## 2019-10-10 PROCEDURE — 86256 FLUORESCENT ANTIBODY TITER: CPT

## 2019-10-10 PROCEDURE — 85610 PROTHROMBIN TIME: CPT

## 2019-10-10 PROCEDURE — 81001 URINALYSIS AUTO W/SCOPE: CPT | Performed by: FAMILY MEDICINE

## 2019-10-10 PROCEDURE — 84075 ASSAY ALKALINE PHOSPHATASE: CPT

## 2019-10-10 PROCEDURE — 82390 ASSAY OF CERULOPLASMIN: CPT

## 2019-10-10 PROCEDURE — 86038 ANTINUCLEAR ANTIBODIES: CPT

## 2019-10-10 PROCEDURE — 82103 ALPHA-1-ANTITRYPSIN TOTAL: CPT

## 2019-10-10 PROCEDURE — 86235 NUCLEAR ANTIGEN ANTIBODY: CPT

## 2019-10-10 PROCEDURE — 83540 ASSAY OF IRON: CPT

## 2019-10-10 PROCEDURE — 86709 HEPATITIS A IGM ANTIBODY: CPT

## 2019-10-10 PROCEDURE — 82728 ASSAY OF FERRITIN: CPT

## 2019-10-10 PROCEDURE — 84080 ASSAY ALKALINE PHOSPHATASES: CPT

## 2019-10-10 PROCEDURE — 86803 HEPATITIS C AB TEST: CPT

## 2019-10-10 PROCEDURE — 87340 HEPATITIS B SURFACE AG IA: CPT

## 2019-10-11 LAB
A1AT SERPL-MCNC: 126 MG/DL (ref 90–200)
ACTIN IGG SERPL-ACNC: 12 UNITS (ref 0–19)
CERULOPLASMIN SERPL-MCNC: 25.8 MG/DL (ref 19–39)
MISCELLANEOUS LAB TEST RESULT: NORMAL
MITOCHONDRIA M2 IGG SER-ACNC: <20 UNITS (ref 0–20)
RYE IGE QN: NEGATIVE

## 2019-10-12 LAB
ALP BONE CFR SERPL: 31 % (ref 14–68)
ALP INTEST CFR SERPL: 8 % (ref 0–18)
ALP LIVER CFR SERPL: 61 % (ref 18–85)
ALP SERPL-CCNC: 115 IU/L (ref 39–117)

## 2019-10-13 ENCOUNTER — TELEPHONE (OUTPATIENT)
Dept: FAMILY MEDICINE CLINIC | Facility: CLINIC | Age: 54
End: 2019-10-13

## 2019-10-13 DIAGNOSIS — R82.90 ABNORMAL URINALYSIS: ICD-10-CM

## 2019-10-13 DIAGNOSIS — R16.0 HEPATOMEGALY: Primary | ICD-10-CM

## 2019-10-15 ENCOUNTER — CLINICAL SUPPORT (OUTPATIENT)
Dept: FAMILY MEDICINE CLINIC | Facility: CLINIC | Age: 54
End: 2019-10-15
Payer: MEDICARE

## 2019-10-15 ENCOUNTER — TELEPHONE (OUTPATIENT)
Dept: FAMILY MEDICINE CLINIC | Facility: CLINIC | Age: 54
End: 2019-10-15

## 2019-10-15 DIAGNOSIS — Z11.1 SCREENING FOR TUBERCULOSIS: Primary | ICD-10-CM

## 2019-10-15 PROCEDURE — 86580 TB INTRADERMAL TEST: CPT

## 2019-10-15 NOTE — TELEPHONE ENCOUNTER
----- Message from Raffi Clemente MD sent at 10/13/2019 11:02 PM EDT -----  lipd profile is normal except her TG slightly elevated , pt to follow with low fat diet

## 2019-10-15 NOTE — TELEPHONE ENCOUNTER
----- Message from Louann Goddard MD sent at 10/13/2019  1:28 PM EDT -----  Labs are okay except urine test is positive  Check urine culture    Check liver ultrasound patient with history of hepatomegaly

## 2019-10-15 NOTE — TELEPHONE ENCOUNTER
----- Message from Palmer Corona MD sent at 10/13/2019 11:02 PM EDT -----  lipd profile is normal except her TG slightly elevated , pt to follow with low fat diet

## 2019-10-16 ENCOUNTER — TELEPHONE (OUTPATIENT)
Dept: FAMILY MEDICINE CLINIC | Facility: CLINIC | Age: 54
End: 2019-10-16

## 2019-10-16 NOTE — TELEPHONE ENCOUNTER
----- Message from Jessica Murphy MD sent at 10/13/2019 11:02 PM EDT -----  lipd profile is normal except her TG slightly elevated , pt to follow with low fat diet

## 2019-10-16 NOTE — TELEPHONE ENCOUNTER
----- Message from Derrick Sauer MD sent at 10/13/2019  1:28 PM EDT -----  Labs are okay except urine test is positive  Check urine culture    Check liver ultrasound patient with history of hepatomegaly

## 2019-10-16 NOTE — TELEPHONE ENCOUNTER
Pallavi Wilberyolyr wife answered, she stated she will have Suleman Mosquera (caregiver) call back as soon as he is able

## 2019-10-18 ENCOUNTER — CLINICAL SUPPORT (OUTPATIENT)
Dept: FAMILY MEDICINE CLINIC | Facility: CLINIC | Age: 54
End: 2019-10-18
Payer: MEDICARE

## 2019-10-18 VITALS — TEMPERATURE: 98.9 F

## 2019-10-18 DIAGNOSIS — Z23 ENCOUNTER FOR IMMUNIZATION: Primary | ICD-10-CM

## 2019-10-18 LAB
INDURATION: 0 MM
TB SKIN TEST: NEGATIVE

## 2019-10-18 PROCEDURE — 90686 IIV4 VACC NO PRSV 0.5 ML IM: CPT

## 2019-10-18 PROCEDURE — G0008 ADMIN INFLUENZA VIRUS VAC: HCPCS

## 2019-11-18 DIAGNOSIS — E78.2 MIXED HYPERLIPIDEMIA: ICD-10-CM

## 2019-11-18 DIAGNOSIS — G80.9 CEREBRAL PALSY, UNSPECIFIED TYPE (HCC): ICD-10-CM

## 2019-11-18 RX ORDER — CARBAMAZEPINE 200 MG/1
200 TABLET, EXTENDED RELEASE ORAL 2 TIMES DAILY
Qty: 180 TABLET | Refills: 0 | Status: SHIPPED | OUTPATIENT
Start: 2019-11-18 | End: 2020-02-03 | Stop reason: SDUPTHER

## 2020-02-03 DIAGNOSIS — G80.9 CEREBRAL PALSY, UNSPECIFIED TYPE (HCC): ICD-10-CM

## 2020-02-03 RX ORDER — CARBAMAZEPINE 200 MG/1
200 TABLET, EXTENDED RELEASE ORAL 2 TIMES DAILY
Qty: 180 TABLET | Refills: 0 | Status: SHIPPED | OUTPATIENT
Start: 2020-02-03 | End: 2020-04-20 | Stop reason: SDUPTHER

## 2020-04-20 DIAGNOSIS — G80.9 CEREBRAL PALSY, UNSPECIFIED TYPE (HCC): ICD-10-CM

## 2020-04-20 RX ORDER — CARBAMAZEPINE 200 MG/1
200 TABLET, EXTENDED RELEASE ORAL 2 TIMES DAILY
Qty: 180 TABLET | Refills: 0 | Status: SHIPPED | OUTPATIENT
Start: 2020-04-20 | End: 2020-07-27 | Stop reason: SDUPTHER

## 2020-04-23 ENCOUNTER — TELEPHONE (OUTPATIENT)
Dept: FAMILY MEDICINE CLINIC | Facility: CLINIC | Age: 55
End: 2020-04-23

## 2020-04-23 DIAGNOSIS — R82.90 ABNORMAL URINALYSIS: ICD-10-CM

## 2020-04-23 DIAGNOSIS — E78.2 MIXED HYPERLIPIDEMIA: Primary | ICD-10-CM

## 2020-04-23 DIAGNOSIS — R16.0 HEPATOMEGALY: ICD-10-CM

## 2020-04-23 DIAGNOSIS — D72.829 LEUKOCYTOSIS, UNSPECIFIED TYPE: ICD-10-CM

## 2020-04-23 DIAGNOSIS — G40.909 SEIZURE DISORDER (HCC): ICD-10-CM

## 2020-04-30 ENCOUNTER — TELEMEDICINE (OUTPATIENT)
Dept: FAMILY MEDICINE CLINIC | Facility: CLINIC | Age: 55
End: 2020-04-30
Payer: MEDICARE

## 2020-04-30 ENCOUNTER — TELEPHONE (OUTPATIENT)
Dept: FAMILY MEDICINE CLINIC | Facility: CLINIC | Age: 55
End: 2020-04-30

## 2020-04-30 VITALS — RESPIRATION RATE: 16 BRPM

## 2020-04-30 DIAGNOSIS — R74.8 ELEVATED ALKALINE PHOSPHATASE LEVEL: ICD-10-CM

## 2020-04-30 DIAGNOSIS — E66.3 OVERWEIGHT: ICD-10-CM

## 2020-04-30 DIAGNOSIS — R82.90 ABNORMAL URINALYSIS: ICD-10-CM

## 2020-04-30 DIAGNOSIS — R91.8 ABNORMAL CT SCAN, LUNG: ICD-10-CM

## 2020-04-30 DIAGNOSIS — R16.0 HEPATOMEGALY: Primary | ICD-10-CM

## 2020-04-30 DIAGNOSIS — G80.9 CEREBRAL PALSY, UNSPECIFIED TYPE (HCC): ICD-10-CM

## 2020-04-30 DIAGNOSIS — Z78.0 MENOPAUSE: ICD-10-CM

## 2020-04-30 DIAGNOSIS — G40.909 SEIZURE DISORDER (HCC): ICD-10-CM

## 2020-04-30 PROCEDURE — 99214 OFFICE O/P EST MOD 30 MIN: CPT | Performed by: FAMILY MEDICINE

## 2020-07-27 DIAGNOSIS — G80.9 CEREBRAL PALSY, UNSPECIFIED TYPE (HCC): ICD-10-CM

## 2020-07-27 RX ORDER — CARBAMAZEPINE 200 MG/1
200 TABLET, EXTENDED RELEASE ORAL 2 TIMES DAILY
Qty: 180 TABLET | Refills: 0 | Status: SHIPPED | OUTPATIENT
Start: 2020-07-27

## 2020-07-27 NOTE — TELEPHONE ENCOUNTER
Medication: carbamazepine  Last refill: 04/20/2020  Last office visit: 04/30/2020  Upcoming office visit:    Call back number is [phone]

## 2020-08-02 DIAGNOSIS — E78.2 MIXED HYPERLIPIDEMIA: ICD-10-CM

## 2020-08-03 RX ORDER — SIMVASTATIN 40 MG
TABLET ORAL
Qty: 90 TABLET | Refills: 0 | Status: SHIPPED | OUTPATIENT
Start: 2020-08-03

## 2020-08-05 NOTE — TELEPHONE ENCOUNTER
Left message on patients answering machine to return phone call regarding medication refill and appointment

## 2021-06-03 ENCOUNTER — TELEPHONE (OUTPATIENT)
Dept: FAMILY MEDICINE CLINIC | Facility: CLINIC | Age: 56
End: 2021-06-03

## 2021-06-24 ENCOUNTER — TELEPHONE (OUTPATIENT)
Dept: FAMILY MEDICINE CLINIC | Facility: CLINIC | Age: 56
End: 2021-06-24

## 2023-09-15 NOTE — PROGRESS NOTES
Assessment and Plan:  Problem List Items Addressed This Visit        Nervous and Auditory    Se[facility] disorder ([facility] )     Well controlled on carbamazepine  Will check carbamazepine levels today         Relevant Medications    carBAMazepine (TEGretol XR) 200 mg 12 hr tablet    Other Relevant Orders    TSH, 3rd generation with Free T4 reflex       Other    Mixed hyperlipidemia     Patient tolerating simvastatin 40 mg well  Will check lipid panel and CMP today to monitor response         Relevant Orders    CBC and differential    Comprehensive metabolic panel    Lipid Panel with Direct LDL reflex    TSH, 3rd generation with Free T4 reflex    Medicare annual wellness visit, subsequent - Primary     Fasting labs including hep c screening drawn today  The patient and her mother both refuse screening procedures such as Pap smear, colonoscopy, mammogram due to patient's MR  Five wishes Packet given today advised to complete for advance directive planning  Other Visit Diagnoses     Cerebral palsy, unspecified type ([place] [id number] )        Relevant Medications    carBAMazepine (TEGretol XR) 200 mg 12 hr tablet    On carbamazepine therapy        Relevant Orders    Carbamazepine level, total    Need for hepatitis C screening test        Relevant Orders    Hepatitis C antibody        Health Maintenance Due   Topic Date Due    Medicare Annual Wellness Visit (AWV)  1965    BMI: Followup Plan  10/15/1981    INFLUENZA VACCINE  07/01/2018         HPI:  Patient Active Problem List   Diagnosis    Ambulatory dysfunction    Mixed hyperlipidemia    Osteoarthritis of knee    [facility] )    Tinea pedis    Encounter for routine adult health examination without abnormal findings    Mental retardation    Medicare annual wellness visit, subsequent     Past Medical History:   Diagnosis Date    Arthritis     C[facility]bral palsy ([facility] )     History of cystocele      No past surgical history on file    Family History   Problem [facility]   PM&R clinic note        Interval history:     Akila Monte presents to clinic today for follow up reg her rehab needs.   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  Was last seen in clinic on 7/25/23.  Recommendations included:  Work-up: continue to follow with current cancer treatment team  Therapy/equipment/braces: referral to physical therapy as well as pelvic floor therapy.  -Physical therapy to work on strengthening as well as scar tissue management as her hips and likely pelvic floor have experienced some fibrosis  Medications: Trial current methocarbamol for night time MSK pain and message back via [email or web address] regarding effectiveness  Interventions: OMM to bilateral hamstring insertions, in particular soft tissue manipulation  Referral / follow up with other providers: Referral to cancer survivorship clinic in order to evaluate for further resources regarding sexual intimacy  Follow up: Follow-up with Dr. Granger with cancer rehab in 3 months    Oncology History:  - 1/24/23- Cancer staged: cT2, cNX, cM  -3/20/2023-neoadjuvant chemotherapy with fluorouracil/mitomycin plus radiation with treatment goal being curative  -4/25/2023-completed CRT with Dr. Huddleston.  -5/2023-hospitalized for neutropenic fever of unknown source.  -7/18/2023-MR rectum with postsurgical changes of anal condyloma fulguration without appreciable residual mass.  Decreased enhancement along the right anal canal when compared to prior MRI from 2/27/2023, likely secondary to postsurgical change.  Resolution of previously demonstrated right necrotic inguinal lymph nodes.  No new lymphadenopathy.  Unchanged 4.5 cm hemorrhagic/proteinaceous right ovarian cyst with small mural nodules.  Recommend attention on follow-up.  Myomatous uterus.  - Visit with Dr. Teixeira on 9/14/23- Recent  Relation Age of Onset    Osteoarthritis Mother     Thyroid disease Mother     Alzheimer's disease Father     Coronary artery disease Maternal Grandmother     Cervical cancer Maternal Grandmother     Coronary artery disease Maternal Grandfather      Social History     Tobacco Use   Smoking Status Never Smoker   Smokeless Tobacco Never Used     Social History     Substance and Sexual Activity   Alcohol Use Never    Frequency: Never      Social History     Substance and Sexual Activity   Drug Use Never         Current Outpatient Medications   Medication Sig Dispense Refill    carBAMazepine (TEGretol XR) 200 mg 12 hr tablet Take 1 tablet (200 mg total) by mouth 2 (two) times a day 180 tablet 1    ibuprofen (MOTRIN) 600 mg tablet TAKE 1 TABLET (600 MG TOTAL) BY MOUTH EVERY 8 (EIGHT) HOURS AS NEEDED FOR MODERATE PAIN 90 tablet 1    simvastatin (ZOCOR) 40 mg tablet TAKE 1 TABLET (40 MG TOTAL) BY MOUTH DAILY AT BEDTIME 30 tablet 11     No current facility-administered medications for this visit  No Known Allergies  Immunization History   Administered Date(s) Administered    INFLUENZA 11/20/2017       Patient Care Team:  Modesto Bella PA-C as PCP - General (Family Medicine)    Medicare Screening Tests and Risk Assessments:  Joselito Mc is here for her Subsequent Wellness visit  Health Risk Assessment:  Patient rates overall health as good  Patient feels that their physical health rating is Same  Eyesight was rated as Same  Hearing was rated as Same  Patient feels that their emotional and mental health rating is Much better  Pain experienced by patient in the last 7 days has been None  Patient states that she has experienced no weight loss or gain in last 6 months  Emotional/Mental Health:  Patient has been feeling nervous/anxious  PHQ-9 Depression Screening:    Frequency of the following problems over the past two weeks:      1  Little interest or pleasure in doing things: 0 - not at all      2  hospitalization for buttocks neuropathic pain, possible piriformis syndrome. Started dilaudid ER at [facility] and dilaudid IR Q3 H prn for pain relief. Deep ice massage to painful areas.      Symptoms,  Zuleika was seen for a return visit today.  Her mom is present for the visit.  Overall, she has really been struggling with significant pain in her left buttocks that appears to radiate down her leg at times.  She recently presented to the ED for this, and saw palliative care yesterday and was prescribed a good pain medication regimen, which she feels is helping.  She would really like to see what this pain is, and address it accordingly targeting the source.  She has continued to work with outpatient physical therapy, and has had some mobilization in the left piriformis muscle to stretch the muscle out, which she states really helps her.  She also complains of tightness in her left medial thigh due to scar tissue.  Her buttocks pain is aggravated, especially when she bends over and certain positions.        Therapies/HEP,  Currently participating in outpatient physical therapy.      Functionally,   Modified independent with mobility, limited by pain.      Social history is unchanged.      Medications:  Current Outpatient Medications   Medication Sig Dispense Refill    acetaminophen (TYLENOL) 325 MG tablet Take 3 tablets (975 mg) by mouth 3 times daily 90 tablet 0    beta carotene 77490 UNIT capsule TAKE ONE CAPSULE BY MOUTH ONCE DAILY 100 capsule 3    blood glucose monitoring (OJANA MICROLET) lancets Use to test blood sugar 8 times daily. 720 each 3    calcium carbonate-vitamin D (CALTRATE) 600-10 MG-MCG per tablet TAKE ONE TABLET BY MOUTH TWICE A DAY (WITH MEALS) 60 tablet 11    carboxymethylcellulose PF (REFRESH PLUS) 0.5 % ophthalmic solution Place 1 drop into the right eye 4 times daily 70 each 0    carvedilol (COREG) 3.125 MG tablet Take 1 tablet (3.125 mg) by mouth 2 times daily (with meals) 60 tablet 11    Continuous  Blood Gluc Transmit (DEXCOM G6 TRANSMITTER) MISC 1 each every 3 months 1 each 3    CONTOUR NEXT TEST test strip USE TO TEST BLOOD SUGAR 5 TIMES PER  each 3    diclofenac (VOLTAREN) 1 % topical gel Apply 4 g topically 4 times daily as needed for moderate pain 150 g 0    EPINEPHrine (EPIPEN 2-PANCHO) 0.3 MG/0.3ML injection 2-pack INJECT 0.3ML INTRAMUSCULARLY ONCE AS NEEDED 0.3 mL 11    estradiol (ESTRACE) 0.1 MG/GM vaginal cream Apply a pea-sized amount topically to affected area 2-3 times a week. 42.5 g 11    ferrous sulfate (FEROSUL) 325 (65 Fe) MG tablet TAKE ONE TABLET BY MOUTH ONCE DAILY 30 tablet 11    Fexofenadine HCl (ALLEGRA PO) Take 180 mg by mouth every evening      fluticasone (FLONASE) 50 MCG/ACT nasal spray APPLY TWO SPRAYS IN EACH NOSTRIL ONCE DAILY AS NEEDED FOR RHINITIS OR ALLERGIES 16 g 4    gabapentin (NEURONTIN) 300 MG capsule Take 2 capsules (600 mg) by mouth 3 times daily 180 capsule 4    GVOKE HYPOPEN 1-PACK 1 MG/0.2ML pen INJECT CONTENTS OF ONE SYRINGE UNDER THE SKIN AS NEEDED FOR SEVERE HYPOGLYCEMIA. 0.2 mL 0    HYDROmorphone (DILAUDID) 2 MG tablet Take 0.5-1 tablets (1-2 mg) by mouth every 4 hours as needed for pain 60 tablet 0    HYDROmorphone HCl ER 8 MG TB24 Take 8 mg by mouth At Bedtime 30 tablet 0    insulin aspart (NOVOLOG VIAL) 100 UNITS/ML vial Up to 80 units daily 30 mL 3    INSULIN BASAL RATE FOR INPATIENT AMBULATORY PUMP Vial to fill pump: NOVOLOG 0.4 units per hour 0800 - 0000. NO basal insulin 0000 - 0800. 1 Month 12    insulin bolus from AMBULATORY PUMP Inject Subcutaneous Take with snacks or supplements (with snacks) Insulin dose = 1 units for 13 grams of carbohydrate 1 Month 12    Insulin Disposable Pump (OMNIPOD 5 G6 POD, GEN 5,) MISC Inject 1 pod Subcutaneous daily 30 each 1    Lidocaine (LIDOCARE) 4 % Patch Place 2 patches onto the skin every 24 hours To prevent lidocaine toxicity, patient should be patch free for 12 hrs daily. 20 patch 0    lipase-protease-amylase  Feeling down, depressed, or hopeless: 0 - not at all  PHQ-2 Score: 0          Broken Bones/Falls: Fall Risk Assessment:    In the past year, patient has experienced: No history of falling in past year  visual disturbance    Bladder/Bowel:  Patient has not leaked urine accidently in the last six months  Patient reports no loss of bowel control  Immunizations:  Patient has not had a flu vaccination within the last year  Patient has not received a pneumonia shot  Patient has not received a shingles shot  Patient has not received tetanus/diphtheria shot  Home Safety:  Patient does not have trouble with stairs inside or outside of their home  Patient currently reports that there are no safety hazards present in home, working smoke alarms, working carbon monoxide detectors  (Additional Comments: Patient has safety chair she uses to get down steps)    Preventative Screenings:   No breast cancer screening performed, no colon cancer screen completed, no cholesterol screen completed, no glaucoma eye exam completed    Nutrition:  Current diet: Regular with servings of the following:    Medications:  Patient is not currently taking any over-the-counter supplements  Patient is able to manage medications  Lifestyle Choices:  Patient reports no tobacco use  Patient has not smoked or used tobacco in the past   Patient reports no alcohol use  Patient does not drive a vehicle  Patient wears seat belt  Activities of Daily Living:  Can get out of bed by his or her self, able to dress self, unable to make own meals, unable to do own shopping, able to bathe self, unable to do laundry/housekeeping, unable to manage own money and other related tasks    Previous Hospitalizations:  No hospitalization or ED visit in past 12 months        Advanced Directives:  Patient has not decided on power of   Patient has not completed advanced directive          Preventative Screening/Counseling:      Cardiovascular: (CREON) 42491-56596-41836 units CPEP TAKE ONE TO THREE CAPSULES BY MOUTH WITH EACH MEAL AND ONE CAPSULE WITH EACH SNACK (TOTAL OF 3 MEALS AND 3 SNACKS PER DAY). 500 capsule 8    magnesium oxide (MAG-OX) 400 MG tablet TAKE TWO TABLETS BY MOUTH THREE TIMES A  tablet 5    meropenem (MERREM) 500 MG vial 50 mLs (500 mg) as needed Nasal installation, once approximately every 2 months per ENT. 50 mL 0    methocarbamol (ROBAXIN) 500 MG tablet Take 250 mg by mouth 3 times daily as needed for muscle spasms      mvw complete formulation (SOFTGELS) capsule TAKE ONE CAPSULE BY MOUTH ONCE DAILY 60 capsule 11    ondansetron (ZOFRAN ODT) 8 MG ODT tab Take 1 tablet (8 mg) by mouth every 8 hours as needed for nausea 30 tablet 2    polyethylene glycol (MIRALAX) 17 GM/Dose powder Take 17 g (1 capful) by mouth 2 times daily      predniSONE (DELTASONE) 2.5 MG tablet Take 1 tablet (2.5 mg) by mouth 2 times daily 60 tablet 11    PROTONIX 40 MG EC tablet TAKE ONE TABLET BY MOUTH TWICE A  tablet 3    sodium chloride (DEEP SEA NASAL SPRAY) 0.65 % nasal spray INSTILL 1-2 SPRAYS IN EACH NOSTRIL EVERY HOUR AS NEEDED FOR CONGESTION (NASAL DRYNESS) 44 mL 11    sulfamethoxazole-trimethoprim (BACTRIM) 400-80 MG tablet TAKE 1 TABLET BY MOUTH THREE TIMES A WEEK 15 tablet 11    tacrolimus (GENERIC EQUIVALENT) 1 mg/mL suspension Take 3.6 mLs (3.6 mg) by mouth 2 times daily 230 mL 11    ursodiol (ACTIGALL) 300 MG capsule TAKE ONE CAPSULE BY MOUTH TWICE A  capsule 3    vitamin B complex with vitamin C (STRESS TAB) tablet Take 1 tablet by mouth daily Pt buying OTC      vitamin C (ASCORBIC ACID) 500 MG tablet TAKE ONE TABLET BY MOUTH TWICE A  tablet 3    vitamin D2 (ERGOCALCIFEROL) 84771 units (1250 mcg) capsule TAKE ONE CAPSULE BY MOUTH EVERY WEEK 5 capsule 11    warfarin ANTICOAGULANT (COUMADIN) 2 MG tablet Take 5 mg every Mon/Thu, 4 mg AOD                Physical Exam:   /85 (BP Location: Left arm, Patient Position:  Counseling: Improve Exercise Tolerance and Healthy Diet     Due for Labs/Analytes/Optional EKG: Lipid Panel          Diabetes:      General: Risks and Benefits Discussed      Counseling: Healthy Diet, Healthy Weight and Improve Physical Activity      Due for labs: Blood Glucose          Colorectal Cancer:      General: Patient Declines          Breast Cancer:      General: Patient Declines          Cervical Cancer:      General: Patient Declines          Osteoporosis:      General: Patient Declines          AAA:      General: Screening Not Indicated          Glaucoma:      General: Screening Current          HIV:      General: Screening Not Indicated          Hepatitis C:      General: Risks and Benefits Discussed      Counseling: has received general HCV counseling        Advanced Directives:   Patient has living will for healthcare, has durable POA for healthcare, patient has an advanced directive  Information on ACP and/or AD provided  5 wishes given  End of life assessment reviewed with patient  Provider agrees with end of life decisions   Additional Comments: POA- mother         No exam data present    Physical Exam:  Review of Systems   Constitutional: Negative for diaphoresis, fatigue and fever  HENT: Negative for congestion, ear pain, hearing loss, postnasal drip, rhinorrhea and sore throat  Eyes: Negative for pain, redness and visual disturbance  Respiratory: Negative for cough, shortness of breath and wheezing  Cardiovascular: Negative for chest pain, palpitations and leg swelling  Gastrointestinal: Negative for anal bleeding, bowel incontinence, constipation, diarrhea, nausea and vomiting  Endocrine: Negative for polydipsia and polyuria  Genitourinary: Negative for dysuria, flank pain and hematuria  Musculoskeletal: Negative for arthralgias, back pain, joint swelling and myalgias  Skin: Negative for pallor, rash and wound     Neurological: Negative for dizziness, syncope, numbness Sitting, Cuff Size: Adult Regular)   Pulse 79   Temp 97.9  F (36.6  C) (Oral)   Wt 47.4 kg (104 lb 9.6 oz)   SpO2 100%   BMI 19.13 kg/m    Gen: NAD, pleasant and cooperative  HEENT: Normocephalic, atraumatic, extra-ocular movements appear intact  Pulm: non-labored breathing in room air  Ext: no edema in BLE, gait without limp, circumduction, or Trendelenburg sign.  Neuro:   Orientation: Oriented to person, place, time, situation. Exhibits good insight into her condition and ongoing management/symptoms.  Motor: Moves bilateral upper extremities freely against gravity  MSK: Negative Drea test bilaterally.  Negative FADIR test bilaterally.  Passive range of motion at hip limited in flexion, particularly on the right side.  Active range of motion at hip in extension and flexion appears intact.  Upon palpation, multiple areas of muscle tightness felt in hip flexors, particularly at the origin/ASIS.  Left piriformis with significant tenderness to palpation.  Negative seated straight leg raise test bilaterally.  Passive range of motion in bilateral knees and bilateral ankles intact.  Gait unremarkable as noted above.    Labs/Imaging:  Lab Results   Component Value Date    WBC 3.9 (L) 09/13/2023    HGB 11.1 (L) 09/13/2023    HCT 32.7 (L) 09/13/2023    MCV 94 09/13/2023     09/13/2023     Lab Results   Component Value Date     09/13/2023    POTASSIUM 5.0 09/13/2023    CHLORIDE 103 09/13/2023    CO2 28 09/13/2023     (H) 09/13/2023     Lab Results   Component Value Date    GFRESTIMATED 79 09/13/2023    GFRESTBLACK >90 06/28/2021     Lab Results   Component Value Date    AST 29 09/13/2023    ALT 32 09/13/2023    GGT 15 02/21/2013    ALKPHOS 87 09/13/2023    BILITOTAL 0.2 09/13/2023    BILICONJ 0.0 01/24/2014     Lab Results   Component Value Date    INR 2.27 (H) 09/13/2023     Lab Results   Component Value Date    BUN 21.8 (H) 09/13/2023    CR 0.90 09/13/2023              Assessment/Plan   Akila NG  and headaches  Hematological: Negative for adenopathy  Does not bruise/bleed easily  Psychiatric/Behavioral: Negative for dysphoric mood and sleep disturbance  The patient is not nervous/anxious  Vitals:    02/19/19 1020   BP: 120/70   BP Location: Left arm   Patient Position: Sitting   Cuff Size: Large   Pulse: 61   Resp: 18   Temp: 97 9 °F (36 6 °C)   TempSrc: Tympanic   SpO2: 98%   Weight: 69 9 kg (154 lb)   Height: 5' 2" (1 575 m)   Body mass index is 28 17 kg/m²  Physical Exam   Constitutional: She is oriented to person, place, and time  She appears well-developed and well-nourished  No distress  HENT:   Head: Normocephalic  Mouth/Throat: Oropharynx is clear and moist    Eyes: Pupils are equal, round, and reactive to light  Neck: No thyromegaly present  Cardiovascular: Normal rate, regular rhythm and normal heart sounds  Exam reveals no gallop and no friction rub  No murmur heard  Pulmonary/Chest: Effort normal and breath sounds normal  No respiratory distress  She has no wheezes  She has no rales  Abdominal: Soft  She exhibits no distension and no mass  There is no tenderness  There is no guarding  Lymphadenopathy:     She has no cervical adenopathy  Neurological: She is alert and oriented to person, place, and time  Skin: Skin is warm and dry  She is not diaphoretic  Mell presents to clinic today for follow up reg her rehab needs.   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 Was last seen in clinic on 7/25/23.  As discussed with her and her mother today, based on her physical exam in clinic, her symptoms does appear to be due to left piriformis syndrome.  We discussed icing, continuing with outpatient physical therapy for muscle mobilization techniques and stretches.  In addition, she could try Voltaren gel 4 times daily as needed to help with pain relief as a topical option.  Neurology had recommended an EMG for further evaluation of radiculopathy/peripheral neuropathy when she was in the ED.  We will place an order for an EMG, which she can hopefully get done before her visit with neurology in January.  She is in agreement with this plan.  We will plan a 3-month return visit.      Work-up:  An EMG was ordered.  Therapy/equipment/braces:  Outpatient physical therapy to help with left piriformis syndrome.  Medications:  Continue current pain medication regimen, diclofenac 4 times daily as needed prescribed.  Continue icing and stretches.  Follow up: 3 to 4 months.      Cassandra Granger MD  Physical Medicine & Rehabilitation      50 minutes spent on the date of the encounter doing chart review, history and exam, documentation and further activities as noted above.